# Patient Record
Sex: FEMALE | URBAN - METROPOLITAN AREA
[De-identification: names, ages, dates, MRNs, and addresses within clinical notes are randomized per-mention and may not be internally consistent; named-entity substitution may affect disease eponyms.]

---

## 2017-11-12 PROBLEM — I95.9 HYPOTENSION: Status: ACTIVE | Noted: 2017-11-12

## 2017-11-12 PROBLEM — M16.0 OSTEOARTHRITIS OF BOTH HIPS: Status: ACTIVE | Noted: 2017-11-12

## 2017-11-12 PROBLEM — D64.9 ANEMIA: Status: ACTIVE | Noted: 2017-11-12

## 2017-11-12 PROBLEM — D69.6 THROMBOCYTOPENIA (HCC): Status: ACTIVE | Noted: 2017-11-12

## 2017-11-12 PROBLEM — S32.591S: Status: ACTIVE | Noted: 2017-11-12

## 2017-11-12 PROBLEM — R11.2 NAUSEA & VOMITING: Status: ACTIVE | Noted: 2017-11-12

## 2017-12-15 ENCOUNTER — OFFICE VISIT (OUTPATIENT)
Dept: ORTHOPEDIC SURGERY | Age: 57
End: 2017-12-15

## 2017-12-15 VITALS — HEART RATE: 76 BPM | RESPIRATION RATE: 16 BRPM | HEIGHT: 65 IN | BODY MASS INDEX: 21.99 KG/M2 | WEIGHT: 132 LBS

## 2017-12-15 DIAGNOSIS — S32.9XXA CLOSED NONDISPLACED FRACTURE OF PELVIS, UNSPECIFIED PART OF PELVIS, INITIAL ENCOUNTER (HCC): ICD-10-CM

## 2017-12-15 DIAGNOSIS — S32.591S INFERIOR PUBIC RAMUS FRACTURE, RIGHT, SEQUELA: ICD-10-CM

## 2017-12-15 PROCEDURE — 99213 OFFICE O/P EST LOW 20 MIN: CPT | Performed by: ORTHOPAEDIC SURGERY

## 2017-12-15 RX ORDER — THYROID,PORK 81.25 MG
TABLET ORAL
Refills: 3 | COMMUNITY
Start: 2017-12-09

## 2017-12-15 NOTE — LETTER
[facility]  [address] [place] [address]  [address] [facility] [address]  Phone: [phone]  Fax: [phone]    Anayeli Eisenberg MD        December 27, 2017     Noni Finch MD  [facility] [address] [id number]    Patient: Ana Schrader  MR Number: [id number]  YOB: 1960  Date of Visit: 12/15/2017    Dear Dr. Noni Finch:    Thank you for the request for consultation for Pati Sina to me for evaluation . Below are the relevant portions of my assessment and plan of care. CHIEF COMPLAINT: Right hip pain, pelvic ring pubic ramus minimally displaced fracture. DATE OF INJURY:  11/11/2017    Ms. Phill Amador 61 y.o.   female  presents today for the first visit for evaluation of a right hip pain which occurred when she fell. She is complaining of right pain. She has been WB using a walker. Rates pain is improving, rates pain a 2-3/10 VAS mild achy with walking and better with rest. The pain is not radiating. No other complaint. She was seen [facility], where she was x-rayed and splinted and asked to f/u with orthopaedics. Past Medical History:   Diagnosis Date    Abnormal Pap 2004    gets pap by gyn. nl leep    Hypothyroidism 2007    Infertility        Past Surgical History:   Procedure Laterality Date    DILATION AND CURETTAGE OF UTERUS      miscarriage    LIPOMA RESECTION      WISDOM TOOTH EXTRACTION         Social History     Social History    Marital status:      Spouse name: N/A    Number of children: 2    Years of education: N/A     Occupational History     part time. Now at home.        Social History Main Topics    Smoking status: Never Smoker    Smokeless tobacco: Never Used    Alcohol use Yes      Comment: a little    Drug use: No    Sexual activity: Not on file     Other Topics Concern    Not on file     Social History Narrative    No narrative on file

## 2017-12-15 NOTE — PROGRESS NOTES
acetaminophen (TYLENOL) 500 MG tablet Take 1 tablet by mouth every 6 hours for 14 days 56 tablet 0    ibuprofen (ADVIL;MOTRIN) 600 MG tablet Take 1 tablet by mouth every 8 hours for 14 days 42 tablet 0    omeprazole (PRILOSEC) 20 MG delayed release capsule Take 1 capsule by mouth daily for 14 days 14 capsule 0    S-Adenosylmethionine (MOOD PLUS JOANA-E DOUBLE ST) 400 MG TBEC Take by mouth      levothyroxine (SYNTHROID) 100 MCG tablet Take 100 mcg by mouth daily. No current facility-administered medications on file prior to visit. Pertinent items are noted in HPI  Review of systems reviewed from Patient History Form dated on 12/15/2017 and available in the patient's chart under the Media tab. No change noted. PHYSICAL EXAMINATION:  Ms. Wilbur Orona is a very pleasant 61 y.o.  female who presents today in no acute distress, awake, alert, and oriented. She is well dressed, nourished and  groomed. Patient with normal affect. Height is  5' 5\" (1.651 m), weight is 132 lb (59.9 kg), Body mass index is 21.97 kg/m². Resting respiratory rate is 16. Examination of the gait, showed that the patient walks with a limp using a walker, PWB right leg . Examination of both lower extrimiteis showing a full range of motion of the right hip compare to the other side. There is no swelling that can be seen, or ecchymosis over the right hip. She  has intact sensation and good pedal pulses. She has mild tenderness on deep palpation over the right pubic ramus. IMAGING: Tin Zuleta were reviewed, dated today in office,  AP pelvis and 2 views of the right hip, and showed a minimally displaced pubic ramus fracture. IMPRESSION: Right pelvic ring pubic ramus minimally displaced fracture. PLAN: I discussed that the overall alignment of this fracture is good and that we can try to treat this non-operatively with a walker and WBAT. We discussed the risk of nonunion and  malunion.  No heavy impact activities. We will see her  back in 2 months at which time we will get a new xray 3 views of the pelvis.       Aidan Humphrey MD

## 2017-12-27 NOTE — COMMUNICATION BODY
activities. We will see her  back in 2 months at which time we will get a new xray 3 views of the pelvis.       Franc Avery MD

## 2018-04-27 ENCOUNTER — OFFICE VISIT (OUTPATIENT)
Dept: ORTHOPEDIC SURGERY | Age: 58
End: 2018-04-27

## 2018-04-27 VITALS
RESPIRATION RATE: 16 BRPM | HEIGHT: 65 IN | DIASTOLIC BLOOD PRESSURE: 67 MMHG | HEART RATE: 78 BPM | WEIGHT: 132 LBS | BODY MASS INDEX: 21.99 KG/M2 | SYSTOLIC BLOOD PRESSURE: 98 MMHG

## 2018-04-27 DIAGNOSIS — S32.591S INFERIOR PUBIC RAMUS FRACTURE, RIGHT, SEQUELA: Primary | ICD-10-CM

## 2018-04-27 PROCEDURE — APPNB15 APP NON BILLABLE TIME 0-15 MINS: Performed by: NURSE PRACTITIONER

## 2018-04-27 PROCEDURE — 99213 OFFICE O/P EST LOW 20 MIN: CPT | Performed by: ORTHOPAEDIC SURGERY
